# Patient Record
Sex: MALE | Race: BLACK OR AFRICAN AMERICAN | NOT HISPANIC OR LATINO | Employment: FULL TIME | URBAN - METROPOLITAN AREA
[De-identification: names, ages, dates, MRNs, and addresses within clinical notes are randomized per-mention and may not be internally consistent; named-entity substitution may affect disease eponyms.]

---

## 2017-08-17 ENCOUNTER — TELEPHONE (OUTPATIENT)
Dept: PHARMACY | Facility: CLINIC | Age: 32
End: 2017-08-17

## 2017-08-17 NOTE — TELEPHONE ENCOUNTER
Faxed prior authorization for Vosevi to insurance company for review on Thurs 08/17/2017 @3:49pm GAYATHRI

## 2017-08-18 NOTE — TELEPHONE ENCOUNTER
DOCUMENTATION ONLY   Vosevi prior authorization approved on 08/18/2017 x 12 weeks.   Approval date: 08/17/2017 to 10/26/2017   PA# [id number]   Co-pay: $3.00

## 2017-08-21 NOTE — TELEPHONE ENCOUNTER
HARRIS Antonio:  Vosevi is approved for Mr Jason Mayberry.  $3 copay    We have attempted to reach out to him to offer consultation and schedule shipment/ of medication however he was unavailable.    We will continue to reach out to him and inform you w\ tentative start date once that is received.        Azael Tom, PharmD, [facility]  [facility]  [phone]

## 2017-08-30 NOTE — TELEPHONE ENCOUNTER
Marisela,    Specialty Pharmacy has received approval for Vosevi on Mr Mayberry.  We have attempted to contact him 8/21, 8/23, 8/28, and 8/30. He has yet to respond to any correspondence attempts.    Please advise on how to proceed going forward.      Azael Tom, PharmD, BCPS  [facility]  [phone]

## 2017-09-12 NOTE — TELEPHONE ENCOUNTER
Marisela and Staff,  [facility] has been attempting to reach Mr Mayberry regarding a Voesvi prescription. After numerous unsuccessful attempts to reach him to initiate treatment with him (8/21, 8/23, 8/28, 8/30, 9/12), and after sending USPS communication to the address on file, no further contact will be made from [facility] until there is response to our mail correspondence.    If there is anything else we can do to further assist, please let us know.  Pharmacy chart will be closed on our end and re-opened if he contact us.  His Vosevi auth expires 10/26/17.    Thanks,  Azael Tom, PharmD, BCPS  [facility]  [phone]

## 2018-04-16 ENCOUNTER — TELEPHONE (OUTPATIENT)
Dept: ADMINISTRATIVE | Facility: HOSPITAL | Age: 33
End: 2018-04-16

## 2020-03-12 ENCOUNTER — HOSPITAL ENCOUNTER (EMERGENCY)
Facility: HOSPITAL | Age: 35
Discharge: HOME | End: 2020-03-13
Attending: EMERGENCY MEDICINE
Payer: MEDICAID

## 2020-03-12 DIAGNOSIS — J36 PERITONSILLAR ABSCESS: Primary | ICD-10-CM

## 2020-03-12 PROCEDURE — 96361 HYDRATE IV INFUSION ADD-ON: CPT

## 2020-03-12 PROCEDURE — 99285 EMERGENCY DEPT VISIT HI MDM: CPT | Mod: ,,, | Performed by: EMERGENCY MEDICINE

## 2020-03-12 PROCEDURE — 63600175 PHARM REV CODE 636 W HCPCS: Performed by: EMERGENCY MEDICINE

## 2020-03-12 PROCEDURE — 96375 TX/PRO/DX INJ NEW DRUG ADDON: CPT

## 2020-03-12 PROCEDURE — 96374 THER/PROPH/DIAG INJ IV PUSH: CPT

## 2020-03-12 PROCEDURE — 99285 PR EMERGENCY DEPT VISIT,LEVEL V: ICD-10-PCS | Mod: ,,, | Performed by: EMERGENCY MEDICINE

## 2020-03-12 PROCEDURE — 99284 EMERGENCY DEPT VISIT MOD MDM: CPT | Mod: 25

## 2020-03-12 PROCEDURE — 42700 I&D ABSCESS PERITONSILLAR: CPT

## 2020-03-12 RX ORDER — DEXAMETHASONE SODIUM PHOSPHATE 4 MG/ML
4 INJECTION, SOLUTION INTRA-ARTICULAR; INTRALESIONAL; INTRAMUSCULAR; INTRAVENOUS; SOFT TISSUE
Status: DISCONTINUED | OUTPATIENT
Start: 2020-03-12 | End: 2020-03-12

## 2020-03-12 RX ORDER — MORPHINE SULFATE 4 MG/ML
4 INJECTION, SOLUTION INTRAMUSCULAR; INTRAVENOUS
Status: COMPLETED | OUTPATIENT
Start: 2020-03-12 | End: 2020-03-12

## 2020-03-12 RX ORDER — DEXAMETHASONE SODIUM PHOSPHATE 4 MG/ML
8 INJECTION, SOLUTION INTRA-ARTICULAR; INTRALESIONAL; INTRAMUSCULAR; INTRAVENOUS; SOFT TISSUE
Status: COMPLETED | OUTPATIENT
Start: 2020-03-12 | End: 2020-03-12

## 2020-03-12 RX ADMIN — DEXAMETHASONE SODIUM PHOSPHATE 8 MG: 4 INJECTION, SOLUTION INTRA-ARTICULAR; INTRALESIONAL; INTRAMUSCULAR; INTRAVENOUS; SOFT TISSUE at 11:03

## 2020-03-12 RX ADMIN — MORPHINE SULFATE 4 MG: 4 INJECTION INTRAVENOUS at 11:03

## 2020-03-13 VITALS
BODY MASS INDEX: 35 KG/M2 | HEART RATE: 82 BPM | TEMPERATURE: 99 F | OXYGEN SATURATION: 98 % | DIASTOLIC BLOOD PRESSURE: 86 MMHG | SYSTOLIC BLOOD PRESSURE: 149 MMHG | RESPIRATION RATE: 16 BRPM | WEIGHT: 280 LBS

## 2020-03-13 LAB
GRAM STN SPEC: NORMAL
GRAM STN SPEC: NORMAL

## 2020-03-13 PROCEDURE — 42700 I&D ABSCESS PERITONSILLAR: CPT

## 2020-03-13 PROCEDURE — 87075 CULTR BACTERIA EXCEPT BLOOD: CPT

## 2020-03-13 PROCEDURE — 87070 CULTURE OTHR SPECIMN AEROBIC: CPT

## 2020-03-13 PROCEDURE — 87077 CULTURE AEROBIC IDENTIFY: CPT

## 2020-03-13 PROCEDURE — 87186 SC STD MICRODIL/AGAR DIL: CPT

## 2020-03-13 PROCEDURE — 87205 SMEAR GRAM STAIN: CPT

## 2020-03-13 PROCEDURE — 87147 CULTURE TYPE IMMUNOLOGIC: CPT

## 2020-03-13 PROCEDURE — 63600175 PHARM REV CODE 636 W HCPCS: Performed by: EMERGENCY MEDICINE

## 2020-03-13 RX ORDER — CLINDAMYCIN HYDROCHLORIDE 150 MG/1
450 CAPSULE ORAL EVERY 8 HOURS
Qty: 63 CAPSULE | Refills: 0 | Status: SHIPPED | OUTPATIENT
Start: 2020-03-13 | End: 2020-03-20

## 2020-03-13 RX ADMIN — SODIUM CHLORIDE 1000 ML: 0.9 INJECTION, SOLUTION INTRAVENOUS at 01:03

## 2020-03-13 NOTE — PROVIDER PROGRESS NOTES - EMERGENCY DEPT.
Encounter Date: 3/12/2020    ED Physician Progress Notes         This patient was signed out to me pending p.o. challenge for discharge after drainage of a peritonsillar abscess..  Patient tolerated p.o. and was discharged home in stable condition.

## 2020-03-13 NOTE — ED PROVIDER NOTES
Encounter Date: 3/12/2020    SCRIBE #1 NOTE: I, Diandra Kimball, am scribing for, and in the presence of,  Dr. Matilda Aguayo. I have scribed the following portions of the note - Other sections scribed: HPI, ROS, and PE.       History     Chief Complaint   Patient presents with    Peritonsillar Abscess     Transfer from [facility] for ENT eval. Patient currently hoarse. Patient able to control saliva and swallow saliva. Some pain with swallowing. Breathing easy. SP02: 98% on room air      The patient was seen by the provider at 10:57 PM.    The patient is a 34 year old male with a past medical history of Hepatitis C and cirrhosis with complaints of a peritonsillar abscess. The patient reports he was transferred from [facility] for an ENT evaluation for a left sided peritonsillar abscess. He states the abscess began two days ago. The patient also endorses difficulty swallowing, foul-tasting drainage, voice changes, and a fever. He denies previous abscesses. The patient reports he received pain medications and antibiotics, but he denies receiving steroids. No difficulty breathing. No neck pain or stiffness. No rash. No HA.      The history is provided by the patient and medical records.     Review of patient's allergies indicates:   Allergen Reactions    Aspirin Hives     Past Medical History:   Diagnosis Date    Cirrhosis     Hepatitis     HCV/ Gt 1a    Hepatitis C     Hypertension     Subclinical hypothyroidism      Past Surgical History:   Procedure Laterality Date    LEG SURGERY      UPPER GASTROINTESTINAL ENDOSCOPY       Family History   Problem Relation Age of Onset    No Known Problems Mother     No Known Problems Father     No Known Problems Sister     No Known Problems Brother     Colon cancer Paternal Grandfather 58     Social History     Tobacco Use    Smoking status: Never Smoker   Substance Use Topics    Alcohol use: No     Alcohol/week: 0.0 standard drinks    Drug use: No     Types: Marijuana      Review of Systems  General: + fever.  No chills.  Eyes: No visual changes.  Head: No headache.    Mouth: + Left sided peritonsillar abscess; + voice change; + difficulty swallowing  Integument: No rashes or lesions.  Chest: No shortness of breath.  Cardiovascular: No chest pain.  Abdomen: No abdominal pain.  No nausea or vomiting.  Urinary: No abnormal urination.  Neurologic: No focal weakness.  No numbness.  Hematologic: No easy bruising.  Endocrine: No excessive thirst or urination.    Physical Exam     Initial Vitals [03/12/20 2207]   BP Pulse Resp Temp SpO2   (!) 154/96 79 16 98.8 °F (37.1 °C) 98 %      MAP       --         Physical Exam  Appearance: No acute distress.  HEENT: Normocephalic. Atraumatic. No conjunctival injection. EOMI. PERRL. + Left peritonsillar erythema and edema with uvular deviation to the right; airway somewhat compromised but intact  Neck: No JVD. Neck supple. No stridor.   Chest: Non-tender. No respiratory distress  Cardiovascular: Regular rate and rhythm. +2 radial pulses bilaterally.  Abdomen: Not distended   Musculoskeletal: Good range of motion all joints. No deformities.    Neurologic: Alert and oriented x 3.  Equal strength in upper and lower extremities bilaterally. Normal sensation. No facial droop. Normal speech.    Psych:  Appropriate, conversant   Integumentary: No rashes seen.  Good turgor.  No abrasions.  No ecchymoses.    ED Course   Procedures  Labs Reviewed   CULTURE, ANAEROBIC   CULTURE, AEROBIC  (SPECIFY SOURCE)   GRAM STAIN          Imaging Results    None          Medical Decision Making:   History:   Old Medical Records: I decided to obtain old medical records.            Scribe Attestation:   Scribe #1: I performed the above scribed service and the documentation accurately describes the services I performed. I attest to the accuracy of the note.                          Clinical Impression:       ICD-10-CM ICD-9-CM   1. Peritonsillar abscess J36 475     35 yo  male w/h/o HepC cirrhosis presents to ED as transfer from OSH for L PTA. Exam shows VSS, afeb. Large L PTA with uvular deviation and slight compromise of airway, but intact without stridor or respiratory distress. ENT consulted as pt was transferred specifically for them. Gave decadron 8 mg, morphine, IVF with improvement. ENT drained abscess and sent for cx. Observed pt for 45 minutes after with soft food trial prior to DC.    Discussed results, diagnosis, and treatment plan with pt; advised close follow-up with PCP. Reviewed strict return precautions. Pt confirms understanding and ability to comply.                             Matilda Aguayo MD  03/13/20 0148

## 2020-03-13 NOTE — CONSULTS
Otolaryngology-Head & Neck Surgery           History & Physical    CHIEF COMPLAINT:  Left PTA      HISTORY OF PRESENT ILLNESS:  Jason Mayberry is a 34 y.o. male who presents to the ER as a transfer from [place] for a left sided PTA. Denies this ever happening to him before. Has had a bad sore throat for last 2 days. No prior tonsils issues. Denies immunocompromise. Here with wife. Does have globus. No trismus. No SOB. Voice is muffled. Afebrile right now. Left sided referred ear otalgia.       REVIEW OF SYSTEMS: 12 point ROS reviewed    MEDICATIONS:   Reviewed and/or reconciled in EPIC    ALLERGIES:  Reviewed and/or reconciled in [employer]    PAST MEDICAL/SURGICAL HISTORY:   Past Medical History:   Diagnosis Date    Cirrhosis     Hepatitis     HCV/ Gt 1a    Hepatitis C     Hypertension     Subclinical hypothyroidism       Past Surgical History:   Procedure Laterality Date    LEG SURGERY      UPPER GASTROINTESTINAL ENDOSCOPY         FAMILY HISTORY:    Family History   Problem Relation Age of Onset    No Known Problems Mother     No Known Problems Father     No Known Problems Sister     No Known Problems Brother     Colon cancer Paternal Grandfather 58       SOCIAL HISTORY:    Social History     Socioeconomic History    Marital status:      Spouse name: Not on file    Number of children: Not on file    Years of education: Not on file    Highest education level: Not on file   Occupational History    Not on file   Social Needs    Financial resource strain: Not on file    Food insecurity:     Worry: Not on file     Inability: Not on file    Transportation needs:     Medical: Not on file     Non-medical: Not on file   Tobacco Use    Smoking status: Never Smoker   Substance and Sexual Activity    Alcohol use: No     Alcohol/week: 0.0 standard drinks    Drug use: No     Types: Marijuana    Sexual activity: Not on file   Lifestyle    Physical activity:     Days per week: Not on file     Minutes per  session: Not on file    Stress: Not on file   Relationships    Social connections:     Talks on phone: Not on file     Gets together: Not on file     Attends Alevism service: Not on file     Active member of club or organization: Not on file     Attends meetings of clubs or organizations: Not on file     Relationship status: Not on file   Other Topics Concern    Not on file   Social History Narrative    Not on file       PHYSICAL EXAM:  VITAL SIGNS:   Vitals:    03/12/20 2207   BP: (!) 154/96   Pulse: 79   Resp: 16   Temp: 98.8 °F (37.1 °C)   SpO2: 98%     GENERAL:  NAD.  Patient appears well nourished, sitting on exam table, in no acute distress.  HEAD: Atraumatic, Normocephalic, no lesions  VOICE: Muffled  FACE: Symmetric, HB 1/6 bilat, no lesions, no obvious sinus tenderness, salivary glands nontender  EYES: Sclera anicteric, PERRLA, EOMI  NOSE: Dorsum straight, septum midline, normal turbinate size, normal mucosa  RIGHT EAR: Pinna and external ear appears normal, EAC patent, TM intact  LEFT EAR: Pinna and external ear appears normal, EAC patent, TM intact  HEARING: Grossly intact  ORAL CAVITY: Healthy mucosa, no masses or lesions including lips, gums, floor of mouth, palate, or tongue.  OROPHARYNX: Left PTA. Drained at bedside. Tolerated well. See procedure note below.   NECK: Supple, no palpable nodes, no masses, trachea midline, no thyroid masses  Cardiovascular system: Pulses regular in both upper extremities, good skin turgor  CARDIOVASCULAR:  RRR  RESPIRATORY:  No wheezing or stridor.  No increased work of breathing, no use of accessory muscles.  EXTREMITIES:  2+ DP pulses b/l, no edema.  SKIN:  Warm, no lesions on exposed skin.  NEUROMUSCULAR:  Cranial nerves II-XII grossly intact.    PSYCH:  Patient is alert and oriented to person, time, place.     After informed consented was obtained. The patient was prepped and drapped in standard fashion. 2cc of 1% lidocaine and epinephrine was injected. The  abscess cavity was aspirated with an 18 gauge needle. 5cc of purulence was expressed. This was sent for culture. Now that we had confirmation of an abscess. The decision was made to make an incision. A 2cm curved incision was made on the left soft palate. A hemostat was used to break into the abscess cavity. 4 more cc's of purulence was expressed. The cavity was irrigated. The patient tolerated the procedure well. There were no complications.     LABORATORY/IMAGING STUDIES: CT neck reviewed. Left PTA. 3X2X2. Well formed.     ASSESSMENT/PLAN: This is a 34 y.o. male with large left PTA [id number]. Tolerated bedside drainage withou    -Tolerated drainage of abscess well  -Recommend PO trial, if tolerating well, may be discharged home from ENT/[facility]NS prospective  -PO Clinda or augmentin for 10 days  -Soft diet as tolerated  -Drink plenty of fluids  -Would like for him to f/u with is in 1-2 weeks. Clinic nurse has been messaged, she will call him in the morning.    Because he is from [place], If he wishes, he can also see a local ENT/Head & Neck Surgeon closer to home.   -Discussed that he may consider tonsillectomy given this infection. We can further discuss this when we see him in clinic.  -Page with questions/concerns    Fredy Hilliard MD  Otolaryngology-[facility]   [phone]

## 2020-03-13 NOTE — ED NOTES
Discharge instructions, diagnosis, medications, and follow up discussed with patient. Patient verbalized understanding. All questions and concerns answered. No needs expressed at the time. Pt is awake, alert and oriented x4 with no acute distress noted. Respirations even and unlabored. Airway open and patent. Per wheelchair out of ED per [facility] transport.

## 2020-03-17 LAB
BACTERIA SPEC AEROBE CULT: ABNORMAL
BACTERIA SPEC AEROBE CULT: ABNORMAL
BACTERIA SPEC ANAEROBE CULT: NORMAL

## 2020-04-07 ENCOUNTER — NURSE TRIAGE (OUTPATIENT)
Dept: ADMINISTRATIVE | Facility: CLINIC | Age: 35
End: 2020-04-07

## 2020-04-07 NOTE — TELEPHONE ENCOUNTER
"C/o headaches, body aches, chest pain, and abd pain x this am. Denies fever/vomiting. Does have a little diarrhea States abd pain is worst of all the symptoms. Currently abd pain 6/10. Took Tylenol x 2 earlier. Got mild relief with pain. Chest pain currently 6/10, "mostly to left" achy, constant.     Reason for Disposition   Chest pain lasting longer than 5 minutes    Additional Information   Negative: Severe difficulty breathing (e.g., struggling for each breath, speaks in single words)   Negative: Passed out (i.e., fainted, collapsed and was not responding)   Negative: Chest pain lasting longer than 5 minutes and ANY of the following:* Over 50 years old* Over 30 years old and at least one cardiac risk factor (i.e., high blood pressure, diabetes, high cholesterol, obesity, smoker or strong family history of heart disease)* Pain is crushing, pressure-like, or heavy * Took nitroglycerin and chest pain was not relieved* History of heart disease (i.e., angina, heart attack, bypass surgery, angioplasty, CHF)   Negative: Visible sweat on face or sweat dripping down face   Negative: Sounds like a life-threatening emergency to the triager   Negative: Followed an injury to chest   Negative: SEVERE chest pain   Negative: Pain also present in shoulder(s) or arm(s) or jaw   Negative: Difficulty breathing   Negative: Cocaine use within last 3 days   Negative: History of prior 'blood clot' in leg or lungs (i.e., deep vein thrombosis, pulmonary embolism)   Negative: Recent illness requiring prolonged bed rest (i.e., immobilization)   Negative: Hip or leg fracture in past 2 months (e.g, or had cast on leg or ankle)   Negative: Major surgery in the past month   Negative: Recent long-distance travel with prolonged time in car, bus, plane, or train (i.e., within past 2 weeks; 6 or more hours duration)   Negative: Heart beating irregularly or very rapidly    Protocols used: CHEST PAIN-A-OH      "

## 2021-05-04 ENCOUNTER — PATIENT MESSAGE (OUTPATIENT)
Dept: RESEARCH | Facility: HOSPITAL | Age: 36
End: 2021-05-04

## 2021-07-31 ENCOUNTER — OFFICE VISIT (OUTPATIENT)
Dept: URGENT CARE | Facility: CLINIC | Age: 36
End: 2021-07-31
Payer: MEDICAID

## 2021-07-31 DIAGNOSIS — Z20.822 ENCOUNTER FOR LABORATORY TESTING FOR COVID-19 VIRUS: Primary | ICD-10-CM

## 2022-01-06 DIAGNOSIS — U07.1 COVID-19 VIRUS DETECTED: ICD-10-CM

## 2022-02-10 ENCOUNTER — PATIENT MESSAGE (OUTPATIENT)
Dept: ADMINISTRATIVE | Facility: HOSPITAL | Age: 37
End: 2022-02-10
Payer: MEDICAID

## 2022-04-04 ENCOUNTER — PATIENT MESSAGE (OUTPATIENT)
Dept: ADMINISTRATIVE | Facility: HOSPITAL | Age: 37
End: 2022-04-04
Payer: MEDICAID

## 2022-09-27 ENCOUNTER — TELEPHONE (OUTPATIENT)
Dept: ADMINISTRATIVE | Facility: HOSPITAL | Age: 37
End: 2022-09-27
Payer: MEDICAID

## 2023-03-03 ENCOUNTER — SPECIALTY PHARMACY (OUTPATIENT)
Dept: PHARMACY | Facility: CLINIC | Age: 38
End: 2023-03-03
Payer: MEDICAID

## 2023-03-08 ENCOUNTER — SPECIALTY PHARMACY (OUTPATIENT)
Dept: PHARMACY | Facility: CLINIC | Age: 38
End: 2023-03-08
Payer: MEDICAID

## 2023-03-08 NOTE — TELEPHONE ENCOUNTER
Order for Vosevi x 12 weeks. PA required via [place] Medicaid Hep C form.    Out going call for patient to confirm e-mail address to send patient portion of form. Patient's wife was able to confirm e-mail address. She has no further questions regarding the status.     Sharlene, this is Donnie Laboy with [facility].  We are working on your prescription that your doctor has sent us. We will be working with your insurance to get this approved for you. We will be calling you along the way with updates on your medication.  If you have any questions, you can reach us at [phone].    Welcome call outcome: Patient/caregiver reached

## 2023-03-14 NOTE — TELEPHONE ENCOUNTER
Signed patient portion of PA form received. Kwesi ABDULLAHI faxed to [place] [employer] at [phone].

## 2023-03-15 NOTE — TELEPHONE ENCOUNTER
Kwesi ABDULLAHI approved from 3/14/2023 - 6/6/2023. PA Case ID: [id number]. Test claim: $0 copay. Pt has [place] Medicaid. No BI/FA necessary.

## 2023-03-20 ENCOUNTER — SPECIALTY PHARMACY (OUTPATIENT)
Dept: PHARMACY | Facility: CLINIC | Age: 38
End: 2023-03-20
Payer: MEDICAID

## 2023-03-20 DIAGNOSIS — B18.2 CHRONIC HEPATITIS C WITHOUT HEPATIC COMA: Primary | ICD-10-CM

## 2023-03-20 NOTE — TELEPHONE ENCOUNTER
Specialty Pharmacy - Initial Clinical Assessment - Vosevi x 12 weeks    Specialty Medication Orders Linked to Encounter      Flowsheet Row Most Recent Value   Medication #1 sofosbuvir-velpatas-voxilaprev (VOSEVI) 400-100-100 mg Tab (Order#[id number], Rx#[id number])          Patient Diagnosis   B18.2 - Chronic hepatitis C without hepatic coma    Subjective    Jason Mayberry is a 37 y.o. male, who is followed by the specialty pharmacy service for management and education.    Recent Encounters       Date Type Provider Description    03/20/2023 Specialty Pharmacy Loida Madsen, Tee Initial Clinical Assessment    03/08/2023 Specialty Pharmacy Emily Murphy, Tee Referral Authorization    03/03/2023 Specialty Pharmacy Donnie Laboy, Tee Referral Authorization          Clinical call attempts since last clinical assessment   3/16/2023  7:10 PM - Specialty Pharmacy - Clinical Assessment by Emily Murphy, Tee     Current Outpatient Medications   Medication Sig    amLODIPine (NORVASC) 5 MG tablet Take 1 tablet (5 mg total) by mouth once daily.    gabapentin (NEURONTIN) 300 MG capsule Take 1 capsule (300 mg total) by mouth 3 (three) times daily.    gabapentin (NEURONTIN) 400 MG capsule Take 1 capsule (400 mg total) by mouth 3 (three) times daily.    sofosbuvir-velpatas-voxilaprev (VOSEVI) 400-100-100 mg Tab Take 1 tablet by mouth once daily.   Last reviewed on 3/20/2023  2:55 PM by Emily Murphy, Tee    Review of patient's allergies indicates:   Allergen Reactions    Aspirin Hives   Last reviewed on  3/20/2023 2:54 PM by Emily Murphy    Drug Interactions    Drug interactions evaluated: yes  Clinically relevant drug interactions identified: no  Provided the patient with educational material regarding drug interactions: not applicable           Assessment Questions - Documented Responses      Flowsheet Row Most Recent Value   Assessment    Medication Reconciliation completed for patient Yes   During the  past 4 weeks, has patient missed any activities due to condition or medication? No   During the past 4 weeks, did patient have any of the following urgent care visits? None   Goals of Therapy Status Discussed (new start)   Status of the patients ability to self-administer: Is Able   All education points have been covered with patient? Yes, supplemental printed education provided   Welcome packet contents reviewed and discussed with patient? Yes   Assesment completed? Yes   Plan Therapy being initiated   Do you need to open a clinical intervention (i-vent)? No   Do you want to schedule first shipment? Yes   Medication #1 Assessment Info    Patient status New medication, New to [facility]   Is this medication appropriate for the patient? Yes   Is this medication effective? Not yet started          Refill Questions - Documented Responses      Flowsheet Row Most Recent Value   Patient Availability and HIPAA Verification    Does patient want to proceed with activity? Yes   HIPAA/medical authority confirmed? Yes   Relationship to patient of person spoken to? Self   Refill Screening Questions    When does the patient need to receive the medication? 03/23/23   Refill Delivery Questions    How will the patient receive the medication? Mail   When does the patient need to receive the medication? 03/23/23   Shipping Address Home   Address in [facility] confirmed and updated if neccessary? Yes   Expected Copay ($) 0   Is the patient able to afford the medication copay? Yes   Payment Method zero copay   Days supply of Refill 28   Supplies needed? No supplies needed   Refill activity completed? Yes   Refill activity plan Refill scheduled   Shipment/Pickup Date: 03/21/23            Objective    He has a past medical history of Cirrhosis, Hepatitis, Hepatitis C, Hypertension, and Subclinical hypothyroidism.    Tried/failed medications: Harvoni    BP Readings from Last 4 Encounters:   02/22/23 (!) 141/81   02/21/23 135/76   12/13/22  "(!) 156/76   10/16/22 (!) 144/82     Ht Readings from Last 4 Encounters:   02/22/23 6' 3" (1.905 m)   02/21/23 6' 3" (1.905 m)   12/13/22 6' 3" (1.905 m)   10/16/22 6' 3" (1.905 m)     Wt Readings from Last 4 Encounters:   02/22/23 (!) 158.3 kg (349 lb)   02/21/23 (!) 158.3 kg (349 lb)   12/13/22 (!) 160.1 kg (353 lb)   10/16/22 (!) 140.6 kg (310 lb)     No results found for: HCVGENOTYPE  Recent Labs   Lab Result Units 02/21/23  1442   Creatinine mg/dL 0.7   ALT U/L 87 H   AST U/L 88 H   Hepatitis B Surface Ag  Non-reactive   Hep B S Ab  28.75  Reactive   Hep B Core Total Ab  Non-reactive     The goals of Hepatitis C Virus (HCV) infection treatment include:  Reducing all-cause mortality and liver-related health adverse consequences, including cirrhosis, end-stage liver disease, and hepatocellular carcinoma  Achieving virologic cure as evidenced by a sustained virologic response  Improving or maintaining quality of life  Maintaining optimal therapy adherence  Minimizing and managing side effects  Preventing the transmission of HCV      Goals of Therapy Status: Discussed (new start)    Assessment/Plan  Patient plans to start therapy on 03/23/23      Indication, dosage, appropriateness, effectiveness, safety and convenience of his specialty medication(s) were reviewed today.     Patient Education   Patient received education on the following:   Expectations and possible outcomes of therapy  Proper use, timely administration, and missed dose management  Duration of therapy  Side effects, including prevention, minimization, and management  Contraindications and safety precautions  New or changed medications, including prescribe and over the counter medications and supplements  Reviews recommended vaccinations, as appropriate  Storage, safe handling, and disposal    Tasks added this encounter   4/13/2023 - Refill Call (Auto Added)  3/30/2023 - 7 Day Post Start Touchbase   Tasks due within next 3 months   No tasks due. "     Emily Murphy, PharmD  [facility]  [address]  [facility] [address]  Phone: [phone]  Fax: [phone]

## 2023-03-30 ENCOUNTER — SPECIALTY PHARMACY (OUTPATIENT)
Dept: PHARMACY | Facility: CLINIC | Age: 38
End: 2023-03-30
Payer: MEDICAID

## 2023-04-14 ENCOUNTER — SPECIALTY PHARMACY (OUTPATIENT)
Dept: PHARMACY | Facility: CLINIC | Age: 38
End: 2023-04-14
Payer: MEDICAID

## 2023-04-14 NOTE — TELEPHONE ENCOUNTER
Specialty Pharmacy - Refill Coordination    Specialty Medication Orders Linked to Encounter      Flowsheet Row Most Recent Value   Medication #1 sofosbuvir-velpatas-voxilaprev (VOSEVI) 400-100-100 mg Tab (Order#[id number], Rx#[id number])            Refill Questions - Documented Responses      Flowsheet Row Most Recent Value   Patient Availability and HIPAA Verification    Does patient want to proceed with activity? Yes   HIPAA/medical authority confirmed? Yes   Relationship to patient of person spoken to? Spouse/Significant Other   Refill Screening Questions    Changes to allergies? No   Changes to medications? No   New conditions since last clinic visit? No   Unplanned office visit, urgent care, ED, or hospital admission in the last 4 weeks? No   How does patient/caregiver feel medication is working? Good   Financial problems or insurance changes? No   How many doses of your specialty medications were missed in the last 4 weeks? 0   Would patient like to speak to a pharmacist? No   When does the patient need to receive the medication? 04/20/23   Refill Delivery Questions    How will the patient receive the medication? Mail   When does the patient need to receive the medication? 04/20/23   Shipping Address Home   Address in [facility] confirmed and updated if neccessary? Yes   Expected Copay ($) 0   Is the patient able to afford the medication copay? Yes   Payment Method zero copay   Days supply of Refill 28   Supplies needed? No supplies needed   Refill activity completed? Yes   Refill activity plan Refill scheduled   Shipment/Pickup Date: 04/17/23            Current Outpatient Medications   Medication Sig    amLODIPine (NORVASC) 5 MG tablet Take 1 tablet (5 mg total) by mouth once daily.    gabapentin (NEURONTIN) 300 MG capsule Take 1 capsule (300 mg total) by mouth 3 (three) times daily.    ketoconazole (NIZORAL) 2 % cream Apply topically once daily.    sofosbuvir-velpatas-voxilaprev (VOSEVI) 400-100-100  mg Tab Take 1 tablet by mouth once daily.    triamcinolone acetonide 0.1% (KENALOG) 0.1 % ointment Apply topically 2 (two) times daily.   Last reviewed on 3/27/2023  7:22 PM by Jacklyn Isabel PA-C    Review of patient's allergies indicates:   Allergen Reactions    Aspirin Hives    Last reviewed on  3/27/2023 7:22 PM by Steph Isabel      Tasks added this encounter   5/11/2023 - Refill Call (Auto Added)   Tasks due within next 3 months   No tasks due.     Makenna Forde, PharmD  [facility] - Specialty Pharmacy  [address] [place] [id number]  Phone: [phone]  Fax: [phone]

## 2023-04-18 ENCOUNTER — TELEPHONE (OUTPATIENT)
Dept: PHARMACY | Facility: CLINIC | Age: 38
End: 2023-04-18
Payer: MEDICAID

## 2023-04-18 NOTE — TELEPHONE ENCOUNTER
Unable to leave voicemail for pt regarding potential delay in vosevi shipment.  We have requested that it be reattempted on scheduled delivery date.

## 2023-04-20 NOTE — TELEPHONE ENCOUNTER
Incoming call from pts wife, she stated that he went to urgent care for tooth abcess and was given Augmentin, Peridex, torodal and IBU prn, Transferred to [place].

## 2023-05-11 ENCOUNTER — PATIENT MESSAGE (OUTPATIENT)
Dept: PHARMACY | Facility: CLINIC | Age: 38
End: 2023-05-11
Payer: MEDICAID

## 2023-05-15 ENCOUNTER — SPECIALTY PHARMACY (OUTPATIENT)
Dept: PHARMACY | Facility: CLINIC | Age: 38
End: 2023-05-15
Payer: MEDICAID

## 2023-05-15 NOTE — TELEPHONE ENCOUNTER
Specialty Pharmacy - Refill Coordination    VOSEVI REFILL 3 of 3  Specialty Medication Orders Linked to Encounter      Flowsheet Row Most Recent Value   Medication #1 sofosbuvir-velpatas-voxilaprev (VOSEVI) 400-100-100 mg Tab (Order#[id number], Rx#[id number])          Vosevi refill (3 of 3) confirmed and reassessment complete.     Patient has ~5 doses of Vosevi remaining and takes it around 2 AM daily when he returns home from work. Pt reports they are not having any side effects at this time. No missed doses, no new medications, no new allergies or health conditions reported at this time. Allergies reviewed and medication reconciliation complete (reviewed and documented in [facility]). Disease education reviewed (including transmission and prevention). Patient counseled on importance of maintaining adherence and keeping lab appointments which were scheduled. All questions answered and addressed to patients satisfaction. Advised to call [facility] and provider if any issues arise.       Refill Questions - Documented Responses      Flowsheet Row Most Recent Value   Patient Availability and HIPAA Verification    Does patient want to proceed with activity? Yes   HIPAA/medical authority confirmed? Yes   Relationship to patient of person spoken to? Spouse/Significant Other  [Deisy]   Refill Screening Questions    Changes to allergies? No   Changes to medications? No   New conditions since last clinic visit? No   Unplanned office visit, urgent care, ED, or hospital admission in the last 4 weeks? No   How does patient/caregiver feel medication is working? Too soon to tell   Financial problems or insurance changes? No   How many doses of your specialty medications were missed in the last 4 weeks? 0   When does the patient need to receive the medication? 05/19/23   Refill Delivery Questions    How will the patient receive the medication? Mail   When does the patient need to receive the medication? 05/19/23    Shipping Address Home   Address in [facility] confirmed and updated if neccessary? Yes   Expected Copay ($) 3   Is the patient able to afford the medication copay? Yes   Payment Method new CC added to file   Days supply of Refill 28   Supplies needed? No supplies needed   Refill activity completed? Yes   Refill activity plan Refill scheduled   Shipment/Pickup Date: 05/16/23            Current Outpatient Medications   Medication Sig    amLODIPine (NORVASC) 5 MG tablet Take 1 tablet (5 mg total) by mouth once daily.    gabapentin (NEURONTIN) 300 MG capsule Take 1 capsule (300 mg total) by mouth 3 (three) times daily. (Patient not taking: Reported on 4/19/2023)    ketoconazole (NIZORAL) 2 % cream Apply topically once daily. (Patient not taking: Reported on 4/19/2023)    ketorolac (TORADOL) 10 mg tablet Take 1 tablet (10 mg total) by mouth every 6 (six) hours as needed for Pain.    sofosbuvir-velpatas-voxilaprev (VOSEVI) 400-100-100 mg Tab Take 1 tablet by mouth once daily.    triamcinolone acetonide 0.1% (KENALOG) 0.1 % ointment Apply topically 2 (two) times daily. (Patient not taking: Reported on 4/19/2023)   Last reviewed on 4/19/2023  1:47 PM by Sheree Brandt PALyleC    Review of patient's allergies indicates:   Allergen Reactions    Aspirin Hives    Last reviewed on  4/19/2023 1:47 PM by Sheree Brandt      Tasks added this encounter   No tasks added.   Tasks due within next 3 months   No tasks due.     Lesly Vanegas, PharmD  [facility] - Specialty Pharm[facility]  [address] [facility] [address]  Phone: [phone]  Fax: [phone]